# Patient Record
Sex: MALE | Race: WHITE | NOT HISPANIC OR LATINO | URBAN - METROPOLITAN AREA
[De-identification: names, ages, dates, MRNs, and addresses within clinical notes are randomized per-mention and may not be internally consistent; named-entity substitution may affect disease eponyms.]

---

## 2023-05-09 ENCOUNTER — HOSPITAL ENCOUNTER (EMERGENCY)
Facility: HOSPITAL | Age: 53
Discharge: PSYCHIATRIC OR SUBSTANCE USE FACILITY | End: 2023-05-10
Attending: EMERGENCY MEDICINE
Payer: MEDICAID

## 2023-05-09 DIAGNOSIS — R45.851 SUICIDAL IDEATION: ICD-10-CM

## 2023-05-09 DIAGNOSIS — F10.920 ALCOHOLIC INTOXICATION WITHOUT COMPLICATION: Primary | ICD-10-CM

## 2023-05-09 LAB
ALBUMIN SERPL BCP-MCNC: 3.3 G/DL (ref 3.5–5.2)
ALP SERPL-CCNC: 74 U/L (ref 55–135)
ALT SERPL W/O P-5'-P-CCNC: 16 U/L (ref 10–44)
AMPHET+METHAMPHET UR QL: NEGATIVE
ANION GAP SERPL CALC-SCNC: 10 MMOL/L (ref 8–16)
APAP SERPL-MCNC: <3 UG/ML (ref 10–20)
AST SERPL-CCNC: 21 U/L (ref 10–40)
BARBITURATES UR QL SCN>200 NG/ML: NEGATIVE
BASOPHILS # BLD AUTO: 0.05 K/UL (ref 0–0.2)
BASOPHILS NFR BLD: 0.6 % (ref 0–1.9)
BENZODIAZ UR QL SCN>200 NG/ML: NEGATIVE
BILIRUB SERPL-MCNC: 0.2 MG/DL (ref 0.1–1)
BILIRUB UR QL STRIP: NEGATIVE
BUN SERPL-MCNC: 11 MG/DL (ref 6–20)
BZE UR QL SCN: NEGATIVE
CALCIUM SERPL-MCNC: 8.3 MG/DL (ref 8.7–10.5)
CANNABINOIDS UR QL SCN: ABNORMAL
CHLORIDE SERPL-SCNC: 109 MMOL/L (ref 95–110)
CLARITY UR: CLEAR
CO2 SERPL-SCNC: 25 MMOL/L (ref 23–29)
COLOR UR: YELLOW
CREAT SERPL-MCNC: 0.8 MG/DL (ref 0.5–1.4)
CREAT UR-MCNC: 148.1 MG/DL (ref 23–375)
DIFFERENTIAL METHOD: ABNORMAL
EOSINOPHIL # BLD AUTO: 0.1 K/UL (ref 0–0.5)
EOSINOPHIL NFR BLD: 1.4 % (ref 0–8)
ERYTHROCYTE [DISTWIDTH] IN BLOOD BY AUTOMATED COUNT: 13.7 % (ref 11.5–14.5)
EST. GFR  (NO RACE VARIABLE): >60 ML/MIN/1.73 M^2
ETHANOL SERPL-MCNC: 276 MG/DL
GLUCOSE SERPL-MCNC: 120 MG/DL (ref 70–110)
GLUCOSE UR QL STRIP: NEGATIVE
HCT VFR BLD AUTO: 48.1 % (ref 40–54)
HGB BLD-MCNC: 15 G/DL (ref 14–18)
HGB UR QL STRIP: NEGATIVE
IMM GRANULOCYTES # BLD AUTO: 0.02 K/UL (ref 0–0.04)
IMM GRANULOCYTES NFR BLD AUTO: 0.2 % (ref 0–0.5)
KETONES UR QL STRIP: NEGATIVE
LEUKOCYTE ESTERASE UR QL STRIP: ABNORMAL
LYMPHOCYTES # BLD AUTO: 2.7 K/UL (ref 1–4.8)
LYMPHOCYTES NFR BLD: 33.1 % (ref 18–48)
MCH RBC QN AUTO: 29.4 PG (ref 27–31)
MCHC RBC AUTO-ENTMCNC: 31.2 G/DL (ref 32–36)
MCV RBC AUTO: 94 FL (ref 82–98)
METHADONE UR QL SCN>300 NG/ML: NEGATIVE
MICROSCOPIC COMMENT: NORMAL
MONOCYTES # BLD AUTO: 0.4 K/UL (ref 0.3–1)
MONOCYTES NFR BLD: 5.5 % (ref 4–15)
NEUTROPHILS # BLD AUTO: 4.8 K/UL (ref 1.8–7.7)
NEUTROPHILS NFR BLD: 59.2 % (ref 38–73)
NITRITE UR QL STRIP: NEGATIVE
NRBC BLD-RTO: 0 /100 WBC
OPIATES UR QL SCN: NEGATIVE
PCP UR QL SCN>25 NG/ML: NEGATIVE
PH UR STRIP: 7 [PH] (ref 5–8)
PLATELET # BLD AUTO: 376 K/UL (ref 150–450)
PMV BLD AUTO: 8.1 FL (ref 9.2–12.9)
POCT GLUCOSE: 96 MG/DL (ref 70–110)
POTASSIUM SERPL-SCNC: 4 MMOL/L (ref 3.5–5.1)
PROT SERPL-MCNC: 7.2 G/DL (ref 6–8.4)
PROT UR QL STRIP: NEGATIVE
RBC # BLD AUTO: 5.1 M/UL (ref 4.6–6.2)
RBC #/AREA URNS HPF: 1 /HPF (ref 0–4)
SODIUM SERPL-SCNC: 144 MMOL/L (ref 136–145)
SP GR UR STRIP: 1.02 (ref 1–1.03)
TOXICOLOGY INFORMATION: ABNORMAL
TSH SERPL DL<=0.005 MIU/L-ACNC: 0.88 UIU/ML (ref 0.4–4)
UNIDENT CRYS URNS QL MICRO: NORMAL
URN SPEC COLLECT METH UR: ABNORMAL
UROBILINOGEN UR STRIP-ACNC: ABNORMAL EU/DL
WBC # BLD AUTO: 8.06 K/UL (ref 3.9–12.7)
WBC #/AREA URNS HPF: 1 /HPF (ref 0–5)
WBC CLUMPS URNS QL MICRO: NORMAL

## 2023-05-09 PROCEDURE — 81000 URINALYSIS NONAUTO W/SCOPE: CPT | Mod: 59 | Performed by: EMERGENCY MEDICINE

## 2023-05-09 PROCEDURE — 84443 ASSAY THYROID STIM HORMONE: CPT | Performed by: EMERGENCY MEDICINE

## 2023-05-09 PROCEDURE — 80307 DRUG TEST PRSMV CHEM ANLYZR: CPT | Performed by: EMERGENCY MEDICINE

## 2023-05-09 PROCEDURE — 96361 HYDRATE IV INFUSION ADD-ON: CPT

## 2023-05-09 PROCEDURE — 80143 DRUG ASSAY ACETAMINOPHEN: CPT | Performed by: EMERGENCY MEDICINE

## 2023-05-09 PROCEDURE — 96372 THER/PROPH/DIAG INJ SC/IM: CPT | Performed by: EMERGENCY MEDICINE

## 2023-05-09 PROCEDURE — 25000003 PHARM REV CODE 250: Performed by: EMERGENCY MEDICINE

## 2023-05-09 PROCEDURE — 82962 GLUCOSE BLOOD TEST: CPT

## 2023-05-09 PROCEDURE — 99285 EMERGENCY DEPT VISIT HI MDM: CPT

## 2023-05-09 PROCEDURE — 96360 HYDRATION IV INFUSION INIT: CPT

## 2023-05-09 PROCEDURE — 80053 COMPREHEN METABOLIC PANEL: CPT | Performed by: EMERGENCY MEDICINE

## 2023-05-09 PROCEDURE — 82077 ASSAY SPEC XCP UR&BREATH IA: CPT | Performed by: EMERGENCY MEDICINE

## 2023-05-09 PROCEDURE — 85025 COMPLETE CBC W/AUTO DIFF WBC: CPT | Performed by: EMERGENCY MEDICINE

## 2023-05-09 PROCEDURE — 63600175 PHARM REV CODE 636 W HCPCS: Performed by: EMERGENCY MEDICINE

## 2023-05-09 RX ORDER — DIAZEPAM 5 MG/1
5 TABLET ORAL ONCE
Status: COMPLETED | OUTPATIENT
Start: 2023-05-10 | End: 2023-05-10

## 2023-05-09 RX ORDER — HALOPERIDOL 5 MG/ML
5 INJECTION INTRAMUSCULAR
Status: COMPLETED | OUTPATIENT
Start: 2023-05-09 | End: 2023-05-09

## 2023-05-09 RX ORDER — LORAZEPAM 2 MG/ML
2 INJECTION INTRAMUSCULAR
Status: COMPLETED | OUTPATIENT
Start: 2023-05-09 | End: 2023-05-09

## 2023-05-09 RX ORDER — SODIUM CHLORIDE 9 MG/ML
1000 INJECTION, SOLUTION INTRAVENOUS ONCE
Status: COMPLETED | OUTPATIENT
Start: 2023-05-09 | End: 2023-05-09

## 2023-05-09 RX ORDER — LORAZEPAM 2 MG/ML
2 INJECTION INTRAMUSCULAR
Status: DISCONTINUED | OUTPATIENT
Start: 2023-05-09 | End: 2023-05-09

## 2023-05-09 RX ADMIN — SODIUM CHLORIDE 1000 ML: 9 INJECTION, SOLUTION INTRAVENOUS at 04:05

## 2023-05-09 RX ADMIN — HALOPERIDOL LACTATE 5 MG: 5 INJECTION, SOLUTION INTRAMUSCULAR at 08:05

## 2023-05-09 RX ADMIN — SODIUM CHLORIDE 1000 ML: 0.9 INJECTION, SOLUTION INTRAVENOUS at 06:05

## 2023-05-09 RX ADMIN — LORAZEPAM 2 MG: 2 INJECTION INTRAMUSCULAR; INTRAVENOUS at 08:05

## 2023-05-09 NOTE — ED PROVIDER NOTES
"Encounter Date: 5/9/2023       History     Chief Complaint   Patient presents with    Alcohol Intoxication     EMS called to 52yo male that bystanders saw sleeping up against a brick wall. Admitted to drinking "a lot" of alcohol today and drinking everyday.     53-year-old male no past medical history presenting today secondary to alcohol intoxication.  Patient states he was drinking a lot just sat down.  He states he then fell asleep.  A [employer] called the ambulance due the fact that he was sleeping outside.  Patient has no complaints.  States no abdominal pain chest pain shortness breath weakness.  States he has been drinking lateral with light outpatient resources for any type of rehab facilities.  No other complaints at this time.  States he is had arm and leg surgery.  Denies any medications.  Denies any allergies.  Denies any drug usage.  States he drinks on a regular basis.  Point of care blood glucose with .      Review of patient's allergies indicates:  No Known Allergies  History reviewed. No pertinent past medical history.  No past surgical history on file.  History reviewed. No pertinent family history.     Review of Systems   Constitutional:  Negative for fever.   HENT:  Negative for sore throat.    Respiratory:  Negative for shortness of breath.    Cardiovascular:  Negative for chest pain.   Gastrointestinal:  Negative for nausea.   Genitourinary:  Negative for dysuria.   Musculoskeletal:  Negative for back pain.   Skin:  Negative for rash.   Neurological:  Negative for weakness.   Hematological:  Does not bruise/bleed easily.     Physical Exam     Initial Vitals [05/09/23 1609]   BP Pulse Resp Temp SpO2   (!) 140/80 88 18 97.6 °F (36.4 °C) 97 %      MAP       --         Physical Exam    Nursing note and vitals reviewed.  Constitutional: He appears well-developed and well-nourished.   Appears intoxicated.  Smells of alcohol.   HENT:   Head: Normocephalic and atraumatic.   Mouth/Throat: " Oropharynx is clear and moist.   Eyes: EOM are normal. Pupils are equal, round, and reactive to light.   Neck:   Normal range of motion.  Cardiovascular:  Normal rate and regular rhythm.           Pulmonary/Chest: Breath sounds normal. No stridor. No respiratory distress. He has no wheezes.   Abdominal: Abdomen is soft. Bowel sounds are normal. He exhibits no distension. There is no abdominal tenderness.   Musculoskeletal:         General: No tenderness or edema. Normal range of motion.      Cervical back: Normal range of motion.      Comments: No extremity or abdominal or chest or spine tenderness.  No tenderness to the head.  No obvious trauma to the head.  No Pitts sign raccoon eyes or septal hematoma.     Neurological: He is alert and oriented to person, place, and time. He has normal strength. GCS score is 15. GCS eye subscore is 4. GCS verbal subscore is 5. GCS motor subscore is 6.   Skin: Skin is warm and dry. Capillary refill takes less than 2 seconds.   Psychiatric: He has a normal mood and affect. Thought content normal.       ED Course   Procedures  Labs Reviewed   CBC W/ AUTO DIFFERENTIAL - Abnormal; Notable for the following components:       Result Value    MCHC 31.2 (*)     MPV 8.1 (*)     All other components within normal limits   COMPREHENSIVE METABOLIC PANEL - Abnormal; Notable for the following components:    Glucose 120 (*)     Calcium 8.3 (*)     Albumin 3.3 (*)     All other components within normal limits   URINALYSIS, REFLEX TO URINE CULTURE - Abnormal; Notable for the following components:    Urobilinogen, UA 2.0-3.0 (*)     Leukocytes, UA Trace (*)     All other components within normal limits    Narrative:     Specimen Source->Urine   DRUG SCREEN PANEL, URINE EMERGENCY - Abnormal; Notable for the following components:    THC Presumptive Positive (*)     All other components within normal limits    Narrative:     Specimen Source->Urine   ALCOHOL,MEDICAL (ETHANOL) - Abnormal; Notable for  the following components:    Alcohol, Serum 276 (*)     All other components within normal limits   ACETAMINOPHEN LEVEL - Abnormal; Notable for the following components:    Acetaminophen (Tylenol), Serum <3.0 (*)     All other components within normal limits   TSH   URINALYSIS MICROSCOPIC    Narrative:     Specimen Source->Urine   POCT GLUCOSE          Imaging Results    None          Medications   diazePAM tablet 5 mg (has no administration in time range)   sodium chloride 0.9% bolus 1,000 mL 1,000 mL (0 mLs Intravenous Stopped 5/9/23 1745)   0.9%  NaCl infusion (0 mLs Intravenous Stopped 5/9/23 1850)   haloperidol lactate injection 5 mg (5 mg Intramuscular Given 5/9/23 2001)   LORazepam injection 2 mg (2 mg Intramuscular Given 5/9/23 2000)     Medical Decision Making:   Initial Assessment:   53-year-old male presenting today secondary to alcohol intoxication.  Patient is able to give me history that he was drinking alcohol and has no complaints.  Exam is reassuring other than disheveled and smells of alcohol.  No signs of recent trauma.  Vitals reassuring.  Blood sugars reassuring.  Answering questions.  Will give fluids wait for sobriety.    6:48 PM  Patient is ambulatory and tolerating p.o..  Answering questions.  Alert and oriented to person place and time.  Discharging with outpatient follow-up and outpatient resources.  Instructed patient please not drink alcohol or binge drink. I discussed with the patient/family the diagnosis, treatment plan, indications for return to the emergency dfpartment, and for expected follow-up. The patient/family verbalized an understanding. The patient/family is asked if there are any questions or concerns. We discuss the case, until all issues are addressed to the patient/family's satisfaction. Patient/family understands and is agreeable to the plan.   Primo Bowers    DISCLAIMER: This note was prepared with [employer] voice recognition transcription software. Garbled syntax,  mangled pronouns, and other bizarre constructions may be attributed to that software system.    7:13 PM  After patient to be discharged he then told nursing that he was going to cut his wrist and kill himself after leaving the emergency department. I had previously asked patient if he was suicidal on multiple conversations previously to which he had said no. Patient is now yelling and screaming at staff. Patient sedated for patient/staff safety with haldol/ativan.    Placed on PEC. Labs ordered.    Labs notable for elevated etoh and marijuana. Plan for patient to be medically cleared on repeat etoh.     Please put in 35 minutes of critical care due to patient having a high risk of respiratory neurological failure that needed sedation secondary to agitation.  Patient has a high resource utilizer needing nursing, ancillary staff, security to help with safety of patient, staff, and myself.   Separate from teaching and exclusive of procedure and ekg time  Includes:  Time at bedside  Time reviewing test results  Time discussing case with staff  Time documenting the medical record  Time spent with family members  Time spent with consults  Management        Clinical Tests:   Lab Tests: Ordered and Reviewed              Medically cleared for psychiatry placement: 5/10/2023 12:00 AM         Clinical Impression:   Final diagnoses:  [F10.920] Alcoholic intoxication without complication (Primary)  [R45.851] Suicidal ideation        ED Disposition Condition    Transfer to Psych Facility Stable          ED Prescriptions    None       Follow-up Information       Follow up With Specialties Details Why Contact Info    [facility] Esperanza Gong  Schedule an appointment as soon as possible for a visit in 2 days  [address]  [place] [address]  [phone]               Primo Bowers MD  05/09/23 1859       Primo Bowers MD  05/09/23 1913       Primo Bowers MD  05/10/23 0004

## 2023-05-09 NOTE — DISCHARGE INSTRUCTIONS

## 2023-05-10 VITALS
OXYGEN SATURATION: 97 % | HEART RATE: 64 BPM | BODY MASS INDEX: 29.8 KG/M2 | WEIGHT: 220 LBS | SYSTOLIC BLOOD PRESSURE: 147 MMHG | RESPIRATION RATE: 16 BRPM | TEMPERATURE: 97 F | DIASTOLIC BLOOD PRESSURE: 82 MMHG | HEIGHT: 72 IN

## 2023-05-10 PROCEDURE — 63600175 PHARM REV CODE 636 W HCPCS: Performed by: EMERGENCY MEDICINE

## 2023-05-10 PROCEDURE — 25000003 PHARM REV CODE 250: Performed by: EMERGENCY MEDICINE

## 2023-05-10 RX ORDER — BUPRENORPHINE AND NALOXONE 2; .5 MG/1; MG/1
1 FILM, SOLUBLE BUCCAL; SUBLINGUAL DAILY
Status: DISCONTINUED | OUTPATIENT
Start: 2023-05-10 | End: 2023-05-10 | Stop reason: HOSPADM

## 2023-05-10 RX ORDER — BUPRENORPHINE AND NALOXONE 2; .5 MG/1; MG/1
4 FILM, SOLUBLE BUCCAL; SUBLINGUAL
Status: COMPLETED | OUTPATIENT
Start: 2023-05-10 | End: 2023-05-10

## 2023-05-10 RX ADMIN — BUPRENORPHINE AND NALOXONE 4 FILM: 2; .5 FILM BUCCAL; SUBLINGUAL at 05:05

## 2023-05-10 RX ADMIN — DIAZEPAM 5 MG: 5 TABLET ORAL at 02:05

## 2023-05-10 RX ADMIN — BUPRENORPHINE AND NALOXONE 1 FILM: 2; .5 FILM BUCCAL; SUBLINGUAL at 09:05

## 2023-05-10 NOTE — ED NOTES
Assumed care of pt. Pt resting supine in bed with eyes closed. Equal rise and fall of the chest noted. Pt allowed to sleep at this time. Will monitor.

## 2023-05-10 NOTE — ED NOTES
Patient escorted to psych facility by [facility] ambulance services, hospital security and tech

## 2023-05-10 NOTE — ED NOTES
Upon time of discharge, pt stated that he was having suicidal ideations of cutting his wrists; MD aware, spoke with patient, and placed PEC; pt moved to psych room while awaiting medical clearance for psych placement; pt calm and cooperative at this time; ED sitter and security at bedside; will continue to monitor

## 2023-05-10 NOTE — ED NOTES
Per transfer center, patient accepted at [facility]   To the service of Dr. Zamarripa  Report to be called to [phone]  [facility] will arrange transport.

## 2023-05-10 NOTE — ED NOTES
Facesheet and PEC were faxed to ANNA 's office. PEC was given to registration to scan into the patient's chart.

## 2023-05-10 NOTE — ED NOTES
[facility] to [facility]. I called the unit and told them that [facility] was here for transport. Pt calm and cooperative. Pt placed on EMS stretcher. Pt escorted with Tech and hospital police at side. Pt reports that he will call his family when he arrives at [facility]. NAD noted.

## 2023-05-10 NOTE — ED NOTES
PT had questions about his discharge, where he was going to do and what he was going to do after discharge. Dr Bowers, and night shift Rn notified of pt concerns.